# Patient Record
Sex: FEMALE | Race: WHITE | NOT HISPANIC OR LATINO | Employment: UNEMPLOYED | URBAN - METROPOLITAN AREA
[De-identification: names, ages, dates, MRNs, and addresses within clinical notes are randomized per-mention and may not be internally consistent; named-entity substitution may affect disease eponyms.]

---

## 2019-10-21 ENCOUNTER — HOSPITAL ENCOUNTER (EMERGENCY)
Facility: HOSPITAL | Age: 39
Discharge: HOME | End: 2019-10-21
Attending: EMERGENCY MEDICINE
Payer: COMMERCIAL

## 2019-10-21 VITALS
SYSTOLIC BLOOD PRESSURE: 124 MMHG | HEART RATE: 74 BPM | OXYGEN SATURATION: 99 % | DIASTOLIC BLOOD PRESSURE: 64 MMHG | RESPIRATION RATE: 16 BRPM | WEIGHT: 183 LBS | BODY MASS INDEX: 29.41 KG/M2 | TEMPERATURE: 99 F | HEIGHT: 66 IN

## 2019-10-21 DIAGNOSIS — G89.29 CHRONIC PAIN OF RIGHT ANKLE: Primary | ICD-10-CM

## 2019-10-21 DIAGNOSIS — R52 PAIN: ICD-10-CM

## 2019-10-21 DIAGNOSIS — M25.571 CHRONIC PAIN OF RIGHT ANKLE: Primary | ICD-10-CM

## 2019-10-21 PROCEDURE — 25000003 PHARM REV CODE 250: Performed by: EMERGENCY MEDICINE

## 2019-10-21 PROCEDURE — 99283 EMERGENCY DEPT VISIT LOW MDM: CPT | Mod: 25

## 2019-10-21 RX ORDER — HYDROCODONE BITARTRATE AND ACETAMINOPHEN 5; 325 MG/1; MG/1
1 TABLET ORAL EVERY 6 HOURS PRN
Qty: 9 TABLET | Refills: 0 | Status: SHIPPED | OUTPATIENT
Start: 2019-10-21 | End: 2021-12-01

## 2019-10-21 RX ORDER — HYDROCODONE BITARTRATE AND ACETAMINOPHEN 10; 325 MG/1; MG/1
1 TABLET ORAL
Status: COMPLETED | OUTPATIENT
Start: 2019-10-21 | End: 2019-10-21

## 2019-10-21 RX ADMIN — HYDROCODONE BITARTRATE AND ACETAMINOPHEN 1 TABLET: 10; 325 TABLET ORAL at 12:10

## 2019-10-21 NOTE — DISCHARGE INSTRUCTIONS
Use her crutches until cleared by your doctor.  At elevate the extremity at rest.  Continue the Ace wrap as needed for comfort.  Use Norco for pain.  Follow up with her doctor tomorrow for re-evaluation.  Return as needed for any worsening symptoms, problems, questions or concerns.

## 2019-10-21 NOTE — ED PROVIDER NOTES
SCRIBE #1 NOTE: I, Tiny Cui, am scribing for, and in the presence of, Gurpreet Moffett Jr., MD. I have scribed the entire note.       History     Chief Complaint   Patient presents with    Foot Injury     cast removed Friday from RLE, increased swelling and pain to right foot     Review of patient's allergies indicates:  No Known Allergies      History of Present Illness     HPI    10/21/2019, 12:49 AM  History obtained from the patient      History of Present Illness: Nya Morales is a 39 y.o. female patient who presents to the Emergency Department for evaluation of R ankle swelling/pain after getting cast removed 3 days ago. Symptoms are constant and moderate in severity.  No mitigating or exacerbating factors reported. No other sxs reported. Patient denies any fever, abd pain, back pain, neck pain, and all other sxs at this time. No further complaints or concerns at this time.  Patient knows edema to the lateral ankle and has since resolved plan route here.        Arrival mode: Personal vehicle      PCP: Primary Doctor No     Past Medical History:  History reviewed. No pertinent medical history.    Past Surgical History:  History reviewed. No pertinent surgical history.    Family History:  History reviewed. No pertinent family history.    Social History:  Social History Main Topics    Smoking status: Unknown if ever smoked    Smokeless tobacco: Unknown if ever used    Alcohol Use: Unknown drinking history    Drug Use: Unknown if ever used    Sexual Activity: Unknown          Review of Systems     Review of Systems   Constitutional: Negative for chills and fever.   HENT: Negative for sore throat.    Respiratory: Negative for shortness of breath.    Cardiovascular: Negative for chest pain.   Gastrointestinal: Negative for nausea.   Genitourinary: Negative for dysuria.   Musculoskeletal: Positive for arthralgias (R ankle; swelling). Negative for back pain and neck pain.   Skin: Negative for rash.  "  Neurological: Negative for weakness.   Hematological: Does not bruise/bleed easily.   All other systems reviewed and are negative.       Physical Exam     Initial Vitals [10/21/19 0039]   BP Pulse Resp Temp SpO2   121/61 78 16 98.9 °F (37.2 °C) 98 %      MAP       --          Physical Exam  Nursing Notes and Vital Signs Reviewed.  Constitutional: Patient is in no acute distress. Well-developed and well-nourished.  Head: Atraumatic. Normocephalic.  Eyes:  EOM intact.  No scleral icterus.  ENT: Mucous membranes are moist. Nares clear  Cardiovascular:  Less than 2 sec capillary refill in the right foot.  Normal DP and PT pulse.  Musculoskeletal: Moves all extremities. No obvious deformities.No calf tenderness. There is no gross deformity of the right foot or ankle.  There is no swelling noted.  There is tenderness over the lateral malleolus of the right foot.  No crepitus.  Full active range of motion of the right ankle with some pain.  Neurovascular intact in the foot.  The foot is nontender.  There is no proximal lower leg tenderness. Patient does show me a photo of her leg with some moderate swelling to the lateral aspect of the ankle.  Patient states that this was prior to arrival and has since resolved.  Skin: Warm and dry. No cellulitis  Neurological:  Alert, awake, and appropriate.  Normal speech.  No acute focal neurological deficits are appreciated. Sensation maintain in the right foot  Psychiatric: Normal affect. Good eye contact. Appropriate in content.     ED Course   Procedures  ED Vital Signs:  Vitals:    10/21/19 0039 10/21/19 0053   BP: 121/61    Pulse: 78    Resp: 16    Temp: 98.9 °F (37.2 °C)    TempSrc: Oral    SpO2: 98%    Weight:  83 kg (183 lb)   Height: 5' 6" (1.676 m)        Abnormal Lab Results:  Labs Reviewed - No data to display     All Lab Results:  none    Imaging Results:  Imaging Results          X-Ray Ankle Complete Right (In process)                Per ED physician, pt's XR results " NAF.           The Emergency Provider reviewed the vital signs and test results, which are outlined above.     ED Discussion       1:32 AM: Reassessed pt at this time.  Pt states her condition has improved at this time. Discussed with pt all pertinent ED information and results. Discussed pt dx and plan of tx. Gave pt all f/u and return to the ED instructions. All questions and concerns were addressed at this time. Pt expresses understanding of information and instructions, and is comfortable with plan to discharge. Pt is stable for discharge.    I discussed with patient and/or family/caretaker that evaluation in the ED does not suggest any emergent or life threatening medical conditions requiring immediate intervention beyond what was provided in the ED, and I believe patient is safe for discharge.  Regardless, an unremarkable evaluation in the ED does not preclude the development or presence of a serious of life threatening condition. As such, patient was instructed to return immediately for any worsening or change in current symptoms.         Medical Decision Making:   Clinical Tests:   Radiological Study: Ordered and Reviewed           ED Medication(s):  Medications   HYDROcodone-acetaminophen  mg per tablet 1 tablet (1 tablet Oral Given 10/21/19 0053)       New Prescriptions    HYDROCODONE-ACETAMINOPHEN (NORCO) 5-325 MG PER TABLET    Take 1 tablet by mouth every 6 (six) hours as needed.       Follow-up Information     Joe Gutierrez DPM.    Specialty:  Podiatry  Contact information:  [address]  [address]  [facility] [address] [phone]                       Scribe Attestation:   Scribe #1: I performed the above scribed service and the documentation accurately describes the services I performed. I attest to the accuracy of the note.     Attending:   Physician Attestation Statement for Scribe #1: I, Gurpreet Moffett Jr., MD, personally performed the services described in this  documentation, as scribed by Tiny Cui, in my presence, and it is both accurate and complete.           Clinical Impression       ICD-10-CM ICD-9-CM   1. Chronic pain of right ankle M25.571 719.47    G89.29 338.29   2. Pain R52 780.96       Disposition:   Disposition: Discharged  Condition: Stable         Gurpreet Moffett Jr., MD  10/21/19 0258

## 2019-10-21 NOTE — ED NOTES
Pt c/o right foot/ankle pain. Pt reports her cast was removed Friday from RLE, increased swelling and pain to right foot    Patient identifiers verified and correct for Nya Morales.    LOC: The patient is awake, alert and aware of environment with an appropriate affect, the patient is oriented x 3 and speaking appropriately.  APPEARANCE: Patient resting comfortably and in no acute distress, patient is clean and well groomed, patient's clothing is properly fastened.  SKIN: The skin is warm and dry, color consistent with ethnicity, patient has normal skin turgor and moist mucus membranes, skin intact, no breakdown or bruising noted.  MUSCULOSKELETAL: Patient moving all extremities spontaneously. (+) Right foot/ankle pain, generalized weakness  RESPIRATORY: Airway is open and patent, respirations are spontaneous.  CARDIAC: Patient has a normal rate, no periphreal edema noted, capillary refill < 3 seconds.  ABDOMEN: Soft and non tender to palpation.

## 2024-01-26 ENCOUNTER — OFFICE VISIT (OUTPATIENT)
Dept: SURGERY | Facility: CLINIC | Age: 44
End: 2024-01-26
Payer: MEDICARE

## 2024-01-26 DIAGNOSIS — N64.4 MASTALGIA: ICD-10-CM

## 2024-01-26 DIAGNOSIS — R92.8 ABNORMAL MAMMOGRAM OF RIGHT BREAST: Primary | ICD-10-CM

## 2024-01-26 PROCEDURE — 99203 OFFICE O/P NEW LOW 30 MIN: CPT | Mod: S$GLB,,, | Performed by: SURGERY

## 2024-01-26 NOTE — PROGRESS NOTES
Breast Surgical Oncology  [place]    Date of Service: 2024    SUBJECTIVE:   Chief complaint: right breast abnormal mammogram    HISTORY OF PRESENT ILLNESS:   Nya Glynn is a 43 y.o. female who is kindly referred by Dr. Kell Baker for right breast abnormal mammogram.    A right breast abnormality was identified on routine screening mammography.  Focused mammographic evaluation revealed loosely grouped calcifications middle to posterior depth right breast laterally close to the 9 o'clock position. This was deemed BIRADS 3 and observation was recommended. She denies breast concerns such as masses, nipple discharge, nipple retraction or lumps under the arm.  She denies prior breast surgery or biopsy. She reports bilateral breast pain and increased tenderness. She drinks caffeine daily. She also reports subjective right breast swelling following her imaging study.     Her breast cancer risk factor profile is as follows: Menarche at 14, Menopause at N/A.  She is . Age at first live birth was 24. Family history of cancer is as follows: paternal grandmother with breast cancer at unknown age,  at unknown age.    FAMILY HISTORY:     Family History   Problem Relation Age of Onset    Hypertension Mother     Cervical cancer Mother     Hypertension Father     Hypertension Maternal Grandmother     Stroke Maternal Grandmother     Diabetes Maternal Grandmother     Heart disease Maternal Grandmother     Breast cancer Paternal Grandmother         PAST MEDICAL HISTORY:     Past Medical History:   Diagnosis Date    Anxiety     Depression     DUB (dysfunctional uterine bleeding) 2016    post coital as well    Dysmenorrhea 2016    Endometriosis 2002    Hypertension 2004    Hypoglycemia     Kidney stone 2002    Loin pain hematuria syndrome 2015    uroligist dx as cause of back pain    Menorrhagia 2016       SURGICAL HISTORY:     Past Surgical History:   Procedure Laterality Date     CARPAL TUNNEL RELEASE       SECTION      x2    DIAGNOSTIC LAPAROSCOPY  2002    endometriosis    ELBOW SURGERY      tennis    GASTRIC SLEEVE  2013    LAPAROSCOPIC LYSIS OF ADHESIONS  2016    REMOVAL OF PERITONEAL CYST    LAVH  2016    WITH BS       SOCIAL HISTORY:     Social History     Tobacco Use    Smoking status: Every Day   Substance Use Topics    Alcohol use: Never        MEDICATIONS/ALLERGIES:     Current Outpatient Medications:     ALPRAZolam (XANAX) 1 MG tablet, , Disp: , Rfl:     FLUoxetine 40 MG capsule, 3 capsules, Disp: , Rfl:     glycopyrronium tosylate (QBREXZA) 2.4 % Towl, 1 pad, Disp: , Rfl:     JUBLIA 10 % Mihai, , Disp: , Rfl:     lisinopriL (PRINIVIL,ZESTRIL) 5 MG tablet, , Disp: , Rfl:     metoprolol succinate (TOPROL XL) 25 MG 24 hr tablet, 1 tablet(s) by mouth daily, Disp: , Rfl:     omeprazole (PRILOSEC) 40 MG capsule, , Disp: , Rfl:     varenicline (CHANTIX) 1 mg Tab, , Disp: , Rfl:     VYVANSE 60 mg capsule, , Disp: , Rfl:   Review of patient's allergies indicates:   Allergen Reactions    Aripiprazole Other (See Comments)     Skin flushing and sensitivity     Ferrous sulfate      PO iron irritates stomach-  Other reaction(s): GI intolerance  PO iron irritates stomach-       REVIEW OF SYSTEMS:   I have reviewed 12 systems, including 2 points per system. Pertinent reported positives are: depressed mood, anxiety    PHYSICAL EXAM:   General: The patient appears well and is in no acute distress.     Chaperon present for examination.   BREAST EXAM  No Asymmetry  Right:  - Mass: No  - Skin change: No  - Nipple Discharge: No  - Nipple retraction: No  - Axillary LAD: No  Left:   - Mass: No  - Skin change: No  - Nipple Discharge: No  - Nipple retraction: No  - Axillary LAD: No    IMAGING:   Images were personally reviewed.   Results for orders placed in visit on 23    Mammo Digital Diagnostic Bilat with Luis    Narrative  Result:  Mammo Digital Diagnostic Bilat with Luis  US  Breast Bilateral Complete    History:  Patient is 43 y.o. and is seen for diagnostic imaging.    Films Compared:  Prior images (if available) were compared.    Findings:  This procedure was performed using tomosynthesis. Computer-aided detection was utilized in the interpretation of this examination.  Mammogram:  There are some loosely grouped calcifications middle to posterior depth right breast laterally close to the 9 o'clock position which have increased and I believe are probably benign appearing round and similar in nature, and also appearing similar to a few scattered calcifications throughout remainder of the right and left breast.  Negative for focal mass or suspicious architectural distortion.    Bilateral breast ultrasound:  Four-quadrant bilateral breast ultrasound performed with history of pain.  No abnormalities.    Impression  Probably benign calcifications right breast.  Six-month follow-up right breast mammogram recommended.    BI-RADS Category:  Overall: 3 - Probably Benign      Recommendation:  Short interval follow-up is recommended in 6 months.    PATHOLOGY:   none    ASSESSMENT:     1. Abnormal mammogram of right breast    2. Mastalgia          PLAN:     We discussed that the designation of ACR BI-RADS 3 (Probably Benign) indicates that the finding has a 2% or less chance of malignancy through 2 year follow up. Typically, short interval imaging follow up is recommended and repeat diagnostic imaging has been arranged by the Radiology Department.     Image-guided core needle biopsy of BI-RADS 3 lesions is occasionally recommended in certain instances. This is the case in patients age 50 or older or masses with certain features, including: calcification, nonparallel orientation of the mass, non-circumscribed margin, or posterior shadowing.     Nya will return in 6 months for a clinical breast examination in conjunction with her repeat imaging.     We have reviewed that breast pain is  overwhelmingly benign.  There is no national guideline that mandates routine mammography for the evaluation of generalized  cyclical breast pain.  We reviewed that reducing caffeine intake and vitamin D supplementation can improve the symptom of pain.     The patient is in agreement with the plan. Questions were encouraged and answered to patient's satisfaction. Nya will call our office with any questions or concerns.     I spent a total of 30 minutes on this visit. This includes face to face time and non-face to face time preparing to see the patient (eg, review of tests), obtaining and/or reviewing separately obtained history, documenting clinical information in the electronic or other health record, independently interpreting results and communicating results to the patient/family/caregiver, or care coordinator.          Lulu Blandon M.D.

## 2024-01-31 ENCOUNTER — OFFICE VISIT (OUTPATIENT)
Dept: GENETICS | Facility: CLINIC | Age: 44
End: 2024-01-31
Payer: MEDICARE

## 2024-01-31 DIAGNOSIS — Z80.3 FAMILY HISTORY OF BREAST CANCER: Primary | ICD-10-CM

## 2024-01-31 DIAGNOSIS — R92.8 ABNORMAL MAMMOGRAM OF RIGHT BREAST: ICD-10-CM

## 2024-01-31 PROCEDURE — 99499 UNLISTED E&M SERVICE: CPT | Mod: 95,,, | Performed by: INTERNAL MEDICINE

## 2024-01-31 NOTE — PROGRESS NOTES
"  Cancer Genetics  Hereditary/High Risk Clinic  Department of Hematology and Oncology  [facility]        Date of Service:  2024  Provider:  Franklin Rodriguez MS, [facility]    Patient Information  Name:  Nya Glynn  :  1980  MRN:  [id number]        Referring Provider: Lulu Blandon MD     Televisit Information  The patient location is: [place], [place].  The chief complaint leading to consultation is: as below.  Visit type: audiovisual.  Face-to-face time with patient: approximately 30 minutes.  Approximately 96 minutes in total were spent on this encounter, which includes face-to-face time and non-face-to-face time preparing to see the patient (e.g., review of tests), obtaining and/or reviewing separately obtained history, documenting clinical information in the electronic or other health record, independently interpreting results (not separately reported) and communicating results to the patient/family/caregiver, or care coordination (not separately reported).  Each patient to whom he or she provides medical services by telemedicine is:  (1) informed of the relationship between the physician and patient and the respective role of any other health care provider with respect to management of the patient; and (2) notified that he or she may decline to receive medical services by telemedicine and may withdraw from such care at any time.      SUBJECTIVE:      Chief Complaint: Genetic Counseling    History of Present Illness (HPI):  Nya Glynn ("Nya"), 43 y.o., assigned female sex at birth is new to the [facility] Department of Hematology and Oncology and to me.  She was referred by  Breast Surgery  for genetic cancer risk assessment given her family history of breast cancer. She has no personal history of cancer.    Focused Medical History:   Germline cancer-genetic testing:  No  Cancer:  No  Colonoscopy: Yes  Most recent colonoscopy: ~2017, told to repeat in 3 years  Colon polyp:  Yes - " unsure of how many  Mammogram: Yes  Most recent mammo: 12/21/2023  Breast MRI:  No  Other benign tumor/findings:  Yes  BL breast calcifications   Benign skin lesions, moles  Pancreatitis:  No  Pancreatic cyst:  No      Focused Surgical History  Gastric sleeve (2013)  Removal of peritoneal cyst (08/11/2016)  LAVH w/ Bilateral salpingectomy (09/2016)  Both ovaries intact    Ancestry:  Ashkenazi Confucianism ancestry:  No  Paternal:    Maternal:     Focused Family History:  Consanguinity in ancestors:  No  Germline cancer-genetic testing in blood relatives:  No  Cancer in family:  Yes; there are no known blood relatives affected with cancer other than those mentioned in the pedigree below    Family Cancer Pedigree:             Review of Systems:  See HPI.      SUBJECTIVE:   Records Reviewed  Medical History  Problem List  Any pertinent, available Procedures and Pathology reports in both [facility] and [facility]    COUNSELING   Causes of cancer  Germline cancer genetic testing is the testing of genes associated with cancer, known as cancer susceptibility genes.  Just as these genes are inherited from parents, mutations in these genes can be inherited, as well.  A mutation in a cancer susceptibility gene adversely affects the gene's ability to prevent cancer; therefore, carriers of cancer susceptibility gene mutations may be at increased risk for certain cancers.     Only 5-10% cancers are caused by a cancer susceptibility gene mutation, meaning the cancer is genetic/hereditary; rather, most cancers are sporadic.  Causes of sporadic cancers may include environmental risk factors, lifestyle risk factors, and non-modifiable risk factors.  It is important to note that members of a family often share not only their genetics but also risk factors including environmental and lifestyle risk factors, so cancers can be familial.     Potential results of genetic testing, and their implications  Potential  results of genetic testing include positive, negative, and variant of unknown significance (VUS).    A positive result indicates the presence of at least one clinically significant mutation, and the patient's associated cancer risks vary depending upon the cancer susceptibility gene(s) in which there is/are a mutation(s).  With a positive result, in some cases, depending upon the specific result and the patient's clinical history, modified risk management may be recommended, including measures for risk reduction and/or surveillance; however, modified management is not always an option.    A negative result indicates that no clinically significant mutations were identified in the gene(s) tested.    A VUS indicates that there is not presently enough data for the laboratory to make a determination as to whether the variant is clinically significant.  VUSs are not typically acted upon clinically.       The ability to interpret the meaning of a negative genetic testing result in genes associated with cancer with which the patient has not personally been affected, when done prior to testing the appropriate affected relative(s), is significantly limited.  A negative result in the patient does not indicate that she cannot develop cancer, and, in fact, the patient may even be at increased risk for cancer based on shared risk factors with affected relatives.  The most informative candidates for initial genetic testing in a family are those who have been affected with cancer.     Modified management may also be recommended, even with a result of no or unknown significance, based upon risk assessment that incorporates the family history.       Genetic mutation inheritance  If  Nya tests positive for a mutation, her first-degree relatives would each have a 50% chance of having the same mutation, and other, more distantly related blood-relatives would also be at risk of having the same mutation.       Genetic  discrimination  The Genetic Information Nondiscrimination Act (ARCHANA) is [place] federal legislation that provides some protections against use of an individual's genetic information by their health insurer and by their employer.  Title I of ARCHANA prohibits most health insurers from utilizing an individual's genetic information to make decisions regarding insurance eligibility or premium charges.  Title II of ARCHANA prohibits covered entities, including employers, from requesting the genetic information of employees and applicants.  ARCHANA does not protect individuals from genetic discrimination toward health insurance obtained through a job with the  or through the Federal Employees Health Benefits Plan; from genetic discrimination by employers with fewer than 15 employees or if employed by the ; or from genetic discrimination by any other type of policies/entities, including but not limited to life insurance, disability insurance, long-term care insurance,  benefits, and  Health Services benefits.     Genetic testing logistics  An outside laboratory would perform the testing after a blood sample is collected at [facility] or a saliva sample is collected by the patient at home.  With genetic testing, there is a potential for the patient to incur out-of-pocket costs.  Results can take 2-3 weeks.  Post-test genetic counseling can be conducted once the genetic testing results are available.     Assessment/Plan  Based on the information provided by  Nya, she does not meet current criteria for genetic testing according to current clinical guidelines. Nya's clinical history, including personal history and/or family history, is not strongly suggestive of a hereditary cancer syndrome in the family given the family history of cancer.     Offered germline cancer-genetic testing at this time versus deferring testing at this time or declining testing altogether.  Various test panel options were  discussed. Nya decided to proceed with genetic testing through the [facility] BRCA1 and BRCA2 gene sequence and deletion/duplication and 85-gene CustomNext-Cancer Panel.  Nya has provided her informed consent to proceed. She is scheduled for a blood draw.     Questions were encouraged and answered to the patient's satisfaction, and she verbalized understanding of information and agreement with the plan.         Signed,    Franklin Rodriguez MS, [facility]  Certified Genetic Counselor, Hereditary and High-Risk Clinic  Hematology/Oncology, [facility]    01/31/2024

## 2024-02-05 ENCOUNTER — LAB VISIT (OUTPATIENT)
Dept: LAB | Facility: HOSPITAL | Age: 44
End: 2024-02-05
Attending: INTERNAL MEDICINE
Payer: MEDICARE

## 2024-02-05 DIAGNOSIS — Z80.3 FAMILY HISTORY OF BREAST CANCER: ICD-10-CM

## 2024-02-05 PROCEDURE — 36415 COLL VENOUS BLD VENIPUNCTURE: CPT | Performed by: INTERNAL MEDICINE

## 2024-03-08 ENCOUNTER — PATIENT MESSAGE (OUTPATIENT)
Dept: GENETICS | Facility: CLINIC | Age: 44
End: 2024-03-08
Payer: MEDICARE

## 2024-04-05 LAB
GENETIC COUNSELING?: YES
GENSO SPECIMEN TYPE: NORMAL
MISCELLANEOUS GENETIC TEST NAME: NORMAL
PARTENTAL OR SIBLING TESTING?: NO
REFERENCE LAB: NORMAL
TEST RESULT: NORMAL

## 2024-05-24 ENCOUNTER — OFFICE VISIT (OUTPATIENT)
Dept: SURGERY | Facility: CLINIC | Age: 44
End: 2024-05-24
Payer: MEDICARE

## 2024-05-24 DIAGNOSIS — R92.8 ABNORMAL MAMMOGRAM OF RIGHT BREAST: Primary | ICD-10-CM

## 2024-05-24 PROCEDURE — 99214 OFFICE O/P EST MOD 30 MIN: CPT | Mod: S$GLB,,, | Performed by: SURGERY

## 2024-05-24 NOTE — PROGRESS NOTES
Breast Surgical Oncology  [place]    Date of Service: 2024    SUBJECTIVE:   Chief complaint: right breast abnormal mammogram    HISTORY OF PRESENT ILLNESS:   Nya Glynn is a 43 y.o. female who is kindly referred by Dr. Kell Baker for right breast abnormal mammogram.    24  A right breast abnormality was identified on routine screening mammography.  Focused mammographic evaluation revealed loosely grouped calcifications middle to posterior depth right breast laterally close to the 9 o'clock position. This was deemed BIRADS 3 and observation was recommended. She denies breast concerns such as masses, nipple discharge, nipple retraction or lumps under the arm.  She denies prior breast surgery or biopsy. She reports bilateral breast pain and increased tenderness. She drinks caffeine daily. She also reports subjective right breast swelling following her imaging study.     24  Nya Glynn has returned today for interval clinical breast examination and diagnostic imaging of her BIRADS 3 breast calcifications. There are no new breast symptoms reported. She continues to drink caffeine but her breast pain has improved.     Her breast cancer risk factor profile is as follows: Menarche at 14, Menopause at N/A.  She is . Age at first live birth was 24. Family history of cancer is as follows: paternal grandmother with breast cancer at unknown age,  at unknown age.    FAMILY HISTORY:     Family History   Problem Relation Name Age of Onset    Cervical cancer Mother Eva     Skin cancer Mother Eva         type uk    Hypertension Mother Eva     HIV Father [place]     Hypertension Father Isra     Hypertension Maternal Grandmother      Stroke Maternal Grandmother      Diabetes Maternal Grandmother      Heart disease Maternal Grandmother      Lung cancer Paternal Grandmother          mets? on death certificate    Breast cancer Paternal Grandmother  70 - 79        UL vs BL  mastectomy    Prostate cancer Maternal Grandfather  70    Dementia Paternal Grandfather      Other Daughter Amberly Metzgerwayne's syndrome    Heart defect Daughter Summer         VSD, PFO    Coarctation of the aorta Daughter Summer     Miscarriages / Stillbirths Other [place]         PAST MEDICAL HISTORY:     Past Medical History:   Diagnosis Date    Anxiety     Depression     DUB (dysfunctional uterine bleeding) 2016    post coital as well    Dysmenorrhea 2016    Endometriosis 2002    Hypertension 2004    Hypoglycemia     Kidney stone     Loin pain hematuria syndrome 2015    uroligist dx as cause of back pain    Menorrhagia 2016       SURGICAL HISTORY:     Past Surgical History:   Procedure Laterality Date    CARPAL TUNNEL RELEASE       SECTION      x2    DIAGNOSTIC LAPAROSCOPY  2002    endometriosis    ELBOW SURGERY      tennis    GASTRIC SLEEVE  2013    LAPAROSCOPIC LYSIS OF ADHESIONS  2016    REMOVAL OF PERITONEAL CYST    LAVH  2016    WITH BS       SOCIAL HISTORY:     Social History     Tobacco Use    Smoking status: Every Day   Substance Use Topics    Alcohol use: Never        MEDICATIONS/ALLERGIES:     Current Outpatient Medications:     ALPRAZolam (XANAX) 1 MG tablet, , Disp: , Rfl:     FLUoxetine 40 MG capsule, 3 capsules, Disp: , Rfl:     glycopyrronium tosylate (QBREXZA) 2.4 % Towl, 1 pad, Disp: , Rfl:     JUBLIA 10 % Mihai, , Disp: , Rfl:     lisinopriL (PRINIVIL,ZESTRIL) 5 MG tablet, , Disp: , Rfl:     metoprolol succinate (TOPROL XL) 25 MG 24 hr tablet, 1 tablet(s) by mouth daily, Disp: , Rfl:     omeprazole (PRILOSEC) 40 MG capsule, , Disp: , Rfl:     varenicline (CHANTIX) 1 mg Tab, , Disp: , Rfl:     VYVANSE 60 mg capsule, , Disp: , Rfl:   Review of patient's allergies indicates:   Allergen Reactions    Aripiprazole Other (See Comments)     Skin flushing and sensitivity     Ferrous sulfate      PO iron irritates stomach-  Other reaction(s): GI  intolerance  PO iron irritates stomach-       REVIEW OF SYSTEMS:   I have reviewed 12 systems, including 2 points per system. Pertinent reported positives are: depressed mood, anxiety    PHYSICAL EXAM:   General: The patient appears well and is in no acute distress.     Chaperon present for examination.   BREAST EXAM  No Asymmetry  Right:  - Mass: No  - Skin change: No  - Nipple Discharge: No  - Nipple retraction: No  - Axillary LAD: No  Left:   - Mass: No  - Skin change: No  - Nipple Discharge: No  - Nipple retraction: No  - Axillary LAD: No    IMAGING:   Images were personally reviewed.     Results for orders placed in visit on 05/24/24    Mammo Digital Diagnostic Right    Narrative  Result:  Mammo Digital Diagnostic Right    History:  Patient is 43 y.o. and is seen for diagnostic imaging.    Films Compared:  Compared to: 12/21/2023 Mammo Digital Diagnostic Bilat with Luis, 12/21/2023 US Breast Bilateral Complete, 12/15/2022 Mammo Digital Screening Bilat w/ Luis, and 11/19/2021 Mammo Digital Screening Bilat w/ Luis    Findings:  Computer-aided detection was utilized in the interpretation of this examination.    Stable appearing calcifications in the right breast.  Additional six-month follow-up recommended.    Impression  See above.    BI-RADS Category:  Overall: 3 - Probably Benign      Recommendation:  Short interval follow-up is recommended in 6 months.      Your estimated lifetime risk of breast cancer (to age 85) based on Tyrer-Cuzick risk assessment model is Tyrer-Cuzick: 11.89%. According to the American Cancer Society, patients with a lifetime breast cancer risk of 20% or higher might benefit from supplemental screening tests.    PATHOLOGY:   none    ASSESSMENT:     1. Abnormal mammogram of right breast      PLAN:     We discussed that the designation of ACR BI-RADS 3 (Probably Benign) indicates that the finding has a 2% or less chance of malignancy through 2 year follow up. Typically, short interval  imaging follow up is recommended and repeat diagnostic imaging has been arranged by the Radiology Department.     Image-guided core needle biopsy of BI-RADS 3 lesions is occasionally recommended in certain instances. This is the case in patients age 50 or older or masses with certain features, including: calcification, nonparallel orientation of the mass, non-circumscribed margin, or posterior shadowing.     Nya will return in 6 months for a clinical breast examination in conjunction with her repeat imaging.     The patient is in agreement with the plan. Questions were encouraged and answered to patient's satisfaction. Nya will call our office with any questions or concerns.     I spent a total of 30 minutes on this visit. This includes face to face time and non-face to face time preparing to see the patient (eg, review of tests), obtaining and/or reviewing separately obtained history, documenting clinical information in the electronic or other health record, independently interpreting results and communicating results to the patient/family/caregiver, or care coordinator.        Lulu Blandon M.D.

## 2024-05-30 DIAGNOSIS — R92.8 ABNORMAL MAMMOGRAM OF RIGHT BREAST: Primary | ICD-10-CM

## 2024-06-05 ENCOUNTER — HOSPITAL ENCOUNTER (OUTPATIENT)
Dept: RADIOLOGY | Facility: HOSPITAL | Age: 44
Discharge: HOME | End: 2024-06-05
Attending: SURGERY
Payer: MEDICARE

## 2024-06-05 DIAGNOSIS — R92.8 ABNORMAL MAMMOGRAM OF RIGHT BREAST: ICD-10-CM

## 2024-06-05 PROCEDURE — 88341 IMHCHEM/IMCYTCHM EA ADD ANTB: CPT | Mod: 26,59,, | Performed by: STUDENT IN AN ORGANIZED HEALTH CARE EDUCATION/TRAINING PROGRAM

## 2024-06-05 PROCEDURE — 88305 TISSUE EXAM BY PATHOLOGIST: CPT | Performed by: STUDENT IN AN ORGANIZED HEALTH CARE EDUCATION/TRAINING PROGRAM

## 2024-06-05 PROCEDURE — 88360 TUMOR IMMUNOHISTOCHEM/MANUAL: CPT | Performed by: STUDENT IN AN ORGANIZED HEALTH CARE EDUCATION/TRAINING PROGRAM

## 2024-06-05 PROCEDURE — 88360 TUMOR IMMUNOHISTOCHEM/MANUAL: CPT | Mod: 26,,, | Performed by: STUDENT IN AN ORGANIZED HEALTH CARE EDUCATION/TRAINING PROGRAM

## 2024-06-05 PROCEDURE — 19081 BX BREAST 1ST LESION STRTCTC: CPT | Mod: RT

## 2024-06-05 PROCEDURE — 88341 IMHCHEM/IMCYTCHM EA ADD ANTB: CPT | Performed by: STUDENT IN AN ORGANIZED HEALTH CARE EDUCATION/TRAINING PROGRAM

## 2024-06-05 PROCEDURE — 88342 IMHCHEM/IMCYTCHM 1ST ANTB: CPT | Mod: 26,59,, | Performed by: STUDENT IN AN ORGANIZED HEALTH CARE EDUCATION/TRAINING PROGRAM

## 2024-06-05 PROCEDURE — 88305 TISSUE EXAM BY PATHOLOGIST: CPT | Mod: 26,,, | Performed by: STUDENT IN AN ORGANIZED HEALTH CARE EDUCATION/TRAINING PROGRAM

## 2024-06-05 PROCEDURE — 19081 BX BREAST 1ST LESION STRTCTC: CPT | Mod: RT,,, | Performed by: RADIOLOGY

## 2024-06-05 PROCEDURE — 88342 IMHCHEM/IMCYTCHM 1ST ANTB: CPT | Performed by: STUDENT IN AN ORGANIZED HEALTH CARE EDUCATION/TRAINING PROGRAM

## 2024-06-11 ENCOUNTER — TELEPHONE (OUTPATIENT)
Dept: SURGERY | Facility: CLINIC | Age: 44
End: 2024-06-11

## 2024-06-11 LAB
FINAL PATHOLOGIC DIAGNOSIS: NORMAL
GROSS: NORMAL
Lab: NORMAL

## 2024-06-11 NOTE — TELEPHONE ENCOUNTER
Patient just called very concerned for the results of her BX done on 06/05/2024. Please call with results ASAP. She even asked if you could call the lab and get the results faster. Her call back [phone]

## 2024-06-12 ENCOUNTER — TELEPHONE (OUTPATIENT)
Dept: SURGERY | Facility: CLINIC | Age: 44
End: 2024-06-12
Payer: MEDICARE

## 2024-06-12 DIAGNOSIS — D05.11 BREAST NEOPLASM, TIS (DCIS), RIGHT: ICD-10-CM

## 2024-06-12 DIAGNOSIS — R92.8 ABNORMAL MAMMOGRAM OF RIGHT BREAST: Primary | ICD-10-CM

## 2024-06-12 NOTE — TELEPHONE ENCOUNTER
Called patient to discuss breast biopsy results- we reviewed the pathology report and that the next step was to meet with a breast surgical oncologist to discuss removing this area and the treatment plan. Patient scheduled with Dr. Blandon on 6/17/24. Pt scheduled for breast MRI 6/14/24 at 12pm in [place], details and instructions given. Patient educated on expectations for visit and encouraged to bring support person(s). All questions answered and pt denied any other needs at this time.      ----- Message from Gwyn Agudelo DO sent at 6/12/2024  8:55 AM CDT -----  Malignant and concordant.      Thank you.

## 2024-06-12 NOTE — TELEPHONE ENCOUNTER
Called patient to discuss breast biopsy results, no answer, left direct contact number requesting a callback at [phone].

## 2024-06-13 ENCOUNTER — TELEPHONE (OUTPATIENT)
Dept: SURGERY | Facility: CLINIC | Age: 44
End: 2024-06-13
Payer: MEDICARE

## 2024-06-13 ENCOUNTER — LAB VISIT (OUTPATIENT)
Dept: LAB | Facility: HOSPITAL | Age: 44
End: 2024-06-13
Attending: SURGERY
Payer: MEDICARE

## 2024-06-13 DIAGNOSIS — R92.8 ABNORMAL MAMMOGRAM OF RIGHT BREAST: Primary | ICD-10-CM

## 2024-06-13 DIAGNOSIS — R92.8 ABNORMAL MAMMOGRAM OF RIGHT BREAST: ICD-10-CM

## 2024-06-13 LAB
CREAT SERPL-MCNC: 0.8 MG/DL (ref 0.5–1.4)
EST. GFR  (NO RACE VARIABLE): >60 ML/MIN/1.73 M^2

## 2024-06-13 PROCEDURE — 36415 COLL VENOUS BLD VENIPUNCTURE: CPT | Mod: PO | Performed by: SURGERY

## 2024-06-13 PROCEDURE — 82565 ASSAY OF CREATININE: CPT | Performed by: SURGERY

## 2024-06-13 NOTE — TELEPHONE ENCOUNTER
Attempted to contact patient, no answer, left vm to call office.      *regarding Breast MRI appt tomorrow. Patient will need to have lab draw today. The [facility] does not offer same day lab.

## 2024-06-13 NOTE — TELEPHONE ENCOUNTER
----- Message from RT Koko sent at 6/13/2024  7:39 AM CDT -----  Regarding: LABS  Hi, this patient needs STAT creatinine ordered and schedualed at least 24-48 before the date of their MRI on 6/14/24.      We do not have same day labs at the [place] location.     Please contact patient to make them aware of the changes. We can not perform this exam on this patient until lab results are received.    Patients 60 years and older or have risk factors (hypertension or diabetes) must have labs within a 30 day window.    Thanks you!    Lise Gerard (MRI)(CT)(RT)(R)

## 2024-06-14 ENCOUNTER — HOSPITAL ENCOUNTER (OUTPATIENT)
Dept: RADIOLOGY | Facility: HOSPITAL | Age: 44
Discharge: HOME | End: 2024-06-14
Attending: SURGERY
Payer: MEDICARE

## 2024-06-14 DIAGNOSIS — D05.11 BREAST NEOPLASM, TIS (DCIS), RIGHT: ICD-10-CM

## 2024-06-14 PROCEDURE — A9577 INJ MULTIHANCE: HCPCS | Mod: PN | Performed by: SURGERY

## 2024-06-14 PROCEDURE — 25500020 PHARM REV CODE 255: Mod: PN | Performed by: SURGERY

## 2024-06-14 PROCEDURE — 77049 MRI BREAST C-+ W/CAD BI: CPT | Mod: 26,,, | Performed by: RADIOLOGY

## 2024-06-14 PROCEDURE — C8937 CAD BREAST MRI: HCPCS | Mod: TC,PN

## 2024-06-14 RX ADMIN — GADOBENATE DIMEGLUMINE 15 ML: 529 INJECTION, SOLUTION INTRAVENOUS at 01:06

## 2024-06-14 NOTE — PROGRESS NOTES
Breast Surgical Oncology  [place]    Date of Service: 2024    SUBJECTIVE:   Chief complaint: right breast cancer    HISTORY OF PRESENT ILLNESS:   Nya Glynn is a 43 y.o. female who is kindly referred by Dr. Kell Baker for right breast abnormal mammogram.     24  A right breast abnormality was identified on routine screening mammography.  Focused mammographic evaluation revealed loosely grouped calcifications middle to posterior depth right breast laterally close to the 9 o'clock position. This was deemed BIRADS 3 and observation was recommended. She denies breast concerns such as masses, nipple discharge, nipple retraction or lumps under the arm.  She denies prior breast surgery or biopsy. She reports bilateral breast pain and increased tenderness. She drinks caffeine daily. She also reports subjective right breast swelling following her imaging study.      24  Nya Glynn has returned today for interval clinical breast examination and diagnostic imaging of her BIRADS 3 breast calcifications. There are no new breast symptoms reported. She continues to drink caffeine but her breast pain has improved.     24  Following our last visit. Her radiology read after second opinion change from BIRADS 3 designation to BIRADS 4. Stereotactic biopsy confirmed hormone receptor positive DCIS. She has healed from her biopsy. Breast MRI shows 5 cm cluster of ductal distribution non masslike enhancement at the site of the positive biopsy from the 8 to 9 o'clock position consistent with DCIS. She is here to discuss  next steps. 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      Her breast cancer risk factor profile is as follows: Menarche at 14, Menopause at N/A.  She is . Age at first live birth was 24. Family history of cancer is as follows: paternal grandmother with breast cancer at unknown age,  at unknown age.    FAMILY HISTORY:     Family History   Problem  Relation Name Age of Onset    Cervical cancer Mother Eva     Skin cancer Mother Eva         type uk    Hypertension Mother Eva     HIV Father [place]     Hypertension Father Isra     Hypertension Maternal Grandmother      Stroke Maternal Grandmother      Diabetes Maternal Grandmother      Heart disease Maternal Grandmother      Lung cancer Paternal Grandmother          mets? on death certificate    Breast cancer Paternal Grandmother  70 - 79        UL vs BL mastectomy    Prostate cancer Maternal Grandfather  70    Dementia Paternal Grandfather      Other Daughter Amberly         Harshil's syndrome    Heart defect Daughter Summer         VSD, PFO    Coarctation of the aorta Daughter Summer     Miscarriages / Stillbirths Other [place]         PAST MEDICAL HISTORY:     Past Medical History:   Diagnosis Date    Anxiety     Depression     DUB (dysfunctional uterine bleeding) 2016    post coital as well    Dysmenorrhea 2016    Endometriosis 2002    Hypertension 2004    Hypoglycemia     Kidney stone 2002    Loin pain hematuria syndrome 2015    uroligist dx as cause of back pain    Menorrhagia 2016       SURGICAL HISTORY:     Past Surgical History:   Procedure Laterality Date    CARPAL TUNNEL RELEASE       SECTION      x2    DIAGNOSTIC LAPAROSCOPY  2002    endometriosis    ELBOW SURGERY      tennis    GASTRIC SLEEVE  2013    LAPAROSCOPIC LYSIS OF ADHESIONS  2016    REMOVAL OF PERITONEAL CYST    LAVH  2016    WITH BS       SOCIAL HISTORY:     Social History     Tobacco Use    Smoking status: Every Day   Substance Use Topics    Alcohol use: Never        MEDICATIONS/ALLERGIES:     Current Outpatient Medications:     ALPRAZolam (XANAX) 1 MG tablet, , Disp: , Rfl:     FLUoxetine 40 MG capsule, 3 capsules, Disp: , Rfl:     glycopyrronium tosylate (QBREXZA) 2.4 % Towl, 1 pad, Disp: , Rfl:     JUBLIA 10 % Mihai, , Disp: , Rfl:     lisinopriL (PRINIVIL,ZESTRIL) 5 MG tablet, , Disp: , Rfl:      metoprolol succinate (TOPROL XL) 25 MG 24 hr tablet, 1 tablet(s) by mouth daily, Disp: , Rfl:     omeprazole (PRILOSEC) 40 MG capsule, , Disp: , Rfl:     varenicline (CHANTIX) 1 mg Tab, , Disp: , Rfl:     VYVANSE 60 mg capsule, , Disp: , Rfl:   Review of patient's allergies indicates:   Allergen Reactions    Aripiprazole Other (See Comments)     Skin flushing and sensitivity     Ferrous sulfate      PO iron irritates stomach-  Other reaction(s): GI intolerance  PO iron irritates stomach-       REVIEW OF SYSTEMS:   I have reviewed 12 systems, including 2 points per system. Pertinent reported positives are: none    PHYSICAL EXAM:   General: The patient appears well and is in no acute distress.     Vaughn Rodriguez MA was present as a chaperone for the examination.   BREAST EXAM  No Asymmetry  Right:  - Mass: vague nodularity 9:00 radian  - Skin change: biopsy puncture wound at 11:00, puckering of LOQ  - Nipple Discharge: No  - Nipple retraction: No  - Axillary LAD: No  Left:   - Mass: No  - Skin change: No  - Nipple Discharge: No  - Nipple retraction: No  - Axillary LAD: No    IMAGING:   Images were personally reviewed.     Results for orders placed during the hospital encounter of 06/14/24    MRI Breast w/wo Contrast, w/CAD, Bilateral    Narrative  Result:  MRI Breast w/wo Contrast, w/CAD, Bilateral    History:  Patient is 43 y.o. and is seen for breast neoplasm, tis (dcis), right.      Films Compared:  Prior images (if available) were compared.    Technique:  A routine breast MRI was performed with a dedicated breast coil. Pre-contrast STIR were acquired. Then, pre and post contrast T1 weighted fat saturated images were acquired and subtracted with MIP reconstruction.  15mL of intravenous gadolinium contrast was administered. The study was reviewed with [employer] software.    Findings:  There is no evidence of suspicious masses, abnormal enhancement, or other abnormal findings on the left side.    Clumped non  masslike enhancement in a linear distribution from the 8 to 9 o'clock position of the right breast, middle to posterior 3rd depth at the site of the biopsy proven ductal carcinoma and site 2 measuring up to 5 cm in AP dimension.   No discrete masses.    No evidence of internal or axillary lymphadenopathy.    Impression  5 cm cluster of ductal distribution non masslike enhancement at the site of the positive biopsy from the 8 to 9 o'clock position consistent with DCIS.  No MR evidence of contralateral disease or adenopathy.    BI-RADS Category:  Overall: 6 - Known Biopsy-Proven Malignancy    Recommendation:  Surgical Consult is recommended for both breasts.    Your estimated lifetime risk of breast cancer (to age 85) based on Tyrer-Cuzick risk assessment model is Tyrer-Cuzick: 11.89%. According to the American Cancer Society, patients with a lifetime breast cancer risk of 20% or higher might benefit from supplemental screening tests.      Results for orders placed in visit on 05/24/24    Mammo Digital Diagnostic Right    Addendum 5/30/2024  9:26 AM  Discussed with Ms. Glynn. She is scheduled for stereotactic bx at [facility] on 6-5-24.    Addendum 5/29/2024  5:16 PM  Result:  Mammo Digital Diagnostic Right    History:  Patient is 43 y.o. and is seen for diagnostic imaging.    Films Compared:  Multiple prior exams.    Findings:  Computer-aided detection was utilized in the interpretation of this examination.  Upon further review the calcifications in question in the right breast appear slightly increased and more pleomorphic in nature as well as more segmental in distribution within the middle to post depth laterally.      Impression:  As above.    BI-RADS Category:  Overall: 4 - Suspicious      Recommendation:  Stereotactic Biopsy is recommended.  Biopsy is recommended.      Your estimated lifetime risk of breast cancer (to age 85) based on Tyrer-Cuzick risk assessment model is Tyrer-Cuzick: 11.89%.  According to the American Cancer Society, patients with a lifetime breast cancer risk of 20% or higher might benefit from supplemental screening tests.    Narrative  Result:  Mammo Digital Diagnostic Right    History:  Patient is 43 y.o. and is seen for diagnostic imaging.    Films Compared:  Compared to: 12/21/2023 Mammo Digital Diagnostic Bilat with Luis, 12/21/2023 US Breast Bilateral Complete, 12/15/2022 Mammo Digital Screening Bilat w/ Luis, and 11/19/2021 Mammo Digital Screening Bilat w/ Luis    Findings:  Computer-aided detection was utilized in the interpretation of this examination.    Stable appearing calcifications in the right breast.  Additional six-month follow-up recommended.    Impression  See above.    BI-RADS Category:  Overall: 3 - Probably Benign      Recommendation:  Short interval follow-up is recommended in 6 months.      Your estimated lifetime risk of breast cancer (to age 85) based on Tyrer-Cuzick risk assessment model is Tyrer-Cuzick: 11.89%. According to the American Cancer Society, patients with a lifetime breast cancer risk of 20% or higher might benefit from supplemental screening tests.    PATHOLOGY:     Lab Results   Component Value Date    FPATHDX  06/05/2024     Breast, right, stereotactic core biopsy:  Ductal carcinoma in-situ, intermediate nuclear grade, papillary pattern with microcalcifications and central necrosis  Dystrophic microcalcifications, focal pseudoangiomatous stromal hyperplasia (PAS H), and columnar cell change also identified    Tumor biomarkers  Estrogen receptor (ER):  Positive, nearly 100%, strong  Progesterone receptor (PGR):  Positive, 95%, intermediate to strong      Additional IHC:   CK5/6:  Loss of staining in the areas of carcinoma in-situ  P63:  Retention of peripheral myoepithelial cells, supporting the diagnosis of in-situ carcinoma      All immunostains were performed with appropriate controls.        This case was reviewed by Dr. GERALD Blackmon  who concurs  with the above diagnosis.         ASSESSMENT:     1. Breast neoplasm, Tis (DCIS), right          PLAN:     Nya Glynn is a 43 y.o. female who is new diagnosis of Stage 0 (Tis N0 Mx) RIGHT Breast Ductal  Carcinoma in Situ, Grade 2, %, MD 95%. I have reviewed her imaging and pathology reports with her and I have provided her with copies. Today, we reviewed reviewed the guidelines of the National Comprehensive Cancer Network for her diagnosis.    We have discussed the surgical choices of lumpectomy with radiation and mastectomy with or without radiation.  I have explained that the survival is the same, regardless of the surgery chosen.  We have discussed that the local recurrence rate following mastectomy is 2-5%.  I have explained that the local recurrence rate was slightly higher following breast conservation in the large trials that compared mastectomy and breast conservation. However, with current medical therapies, local recurrence has been reduced to as low as 6%. I did review with her the general schedule and side effects of radiation. She will be referred to radiation oncology. We briefly discussed reconstruction options, and the [facility] breast cancer treatment brochure was provided.    We reviewed the need for sentinel lymph node biopsy to further stage the axilla should her diagnosis change on final pathology.      Given the estimated size of the DCIS relative to her breast size, I recommend a mastectomy.  She has been referred to plastic surgery to discuss reconstruction options.     Because her cancer is hormone receptor positive, she will be recommended for endocrine therapy. She understands that she will be referred to a medical oncologist to discuss these points further.    I have provided her with general information regarding the supportive services available here including oncology social work, patient navigation, nutritional services, preoperative and postoperative physical therapy  programs, and genetic counseling.      I spent a total of 75 minutes on this visit. This includes face to face time and non-face to face time preparing to see the patient (eg, review of tests), obtaining and/or reviewing separately obtained history, documenting clinical information in the electronic or other health record, independently interpreting results and communicating results to the patient/family/caregiver, or care coordinator.        Lulu Blandon M.D.

## 2024-06-17 ENCOUNTER — OFFICE VISIT (OUTPATIENT)
Dept: SURGERY | Facility: CLINIC | Age: 44
End: 2024-06-17
Payer: MEDICARE

## 2024-06-17 DIAGNOSIS — D05.11 BREAST NEOPLASM, TIS (DCIS), RIGHT: Primary | ICD-10-CM

## 2024-06-17 PROCEDURE — 99215 OFFICE O/P EST HI 40 MIN: CPT | Mod: S$GLB,,, | Performed by: SURGERY

## 2024-06-17 PROCEDURE — 99417 PROLNG OP E/M EACH 15 MIN: CPT | Mod: S$GLB,,, | Performed by: SURGERY

## 2024-06-19 DIAGNOSIS — D05.11 BREAST NEOPLASM, TIS (DCIS), RIGHT: Primary | ICD-10-CM

## 2024-06-19 NOTE — NURSING
Nurse Navigator Note:     Met with patient, mother and friend during her consult with Dr. Blandon.  Patient and I reviewed the information she discussed with Dr. Blandon, including treatment options, referrals, diagnosis, and future plans for workup. Patient and I went through the new patient booklet, I explained some of the information and why it is provided.   Specifically discussed shared services listed in booklet and how to request referrals. Discussed the role of the nurse navigator and how I can help her through her breast cancer journey.     Patient was given a copy of her appointments, Dr. Blandon's card, and my card. Encouraged her to call me if she has any questions or concerns or would like to schedule any additional appointments. Patient verbalized understanding of all information.      Pt would like plastic surgery consultation with both Dr. Ghotra and Dr. Spears to discuss reconstruction options- unsure at this time. Radiologist messaged regarding addendum to MRI report per patient request. Due to distress score and pt request- urgent referral placed for oncology psychology to address new diagnosis fears and concerns. Once patient decides on surgical choice, she will be referred to a medical oncology to discuss risk reduction. Genetic testing was completed and was negative for mutation.       Oncology Navigation   Intake  Date of Diagnosis: 06/05/24  Cancer Type: Breast  Type of Referral: Internal  Date of Referral: 06/12/24  Initial Nurse Navigator Contact: 06/12/24  Referral to Initial Contact Timeline (days): 0  First Appointment Available: 06/17/24  Appointment Date: 06/17/24  First Available Date vs. Scheduled Date (days): 0  Multiple appointments: No (Pt declined)     Treatment  Current Status: Active    Surgery: Planned  Surgical Oncologist: Lulu Blandon  Type of Surgery: Right mastectomy  Consult Date: 06/17/24                ER: Positive  TX: Positive           Acuity      Follow Up  No  follow-ups on file.

## 2024-07-03 ENCOUNTER — TELEPHONE (OUTPATIENT)
Dept: SURGERY | Facility: CLINIC | Age: 44
End: 2024-07-03
Payer: MEDICARE

## 2024-07-03 NOTE — TELEPHONE ENCOUNTER
Attempted to contact patient to review treatment plan and missed plastic surgery appt, no answer, left voicemail requesting a callback at [phone].

## 2024-10-25 ENCOUNTER — HOSPITAL ENCOUNTER (OUTPATIENT)
Dept: RADIOLOGY | Facility: HOSPITAL | Age: 44
Discharge: HOME | End: 2024-10-25
Attending: RADIOLOGY
Payer: MEDICARE

## 2024-10-25 DIAGNOSIS — C50.911 INFILTRATING DUCTAL CARCINOMA OF RIGHT FEMALE BREAST: ICD-10-CM

## 2024-10-25 PROCEDURE — 93970 EXTREMITY STUDY: CPT | Mod: TC

## 2024-10-25 PROCEDURE — 93970 EXTREMITY STUDY: CPT | Mod: 26,,, | Performed by: RADIOLOGY
